# Patient Record
Sex: FEMALE | NOT HISPANIC OR LATINO | URBAN - METROPOLITAN AREA
[De-identification: names, ages, dates, MRNs, and addresses within clinical notes are randomized per-mention and may not be internally consistent; named-entity substitution may affect disease eponyms.]

---

## 2017-02-17 ENCOUNTER — APPOINTMENT (OUTPATIENT)
Dept: WOMENS IMAGING | Facility: HOSPITAL | Age: 47
End: 2017-02-17

## 2017-02-17 PROCEDURE — 77067 SCR MAMMO BI INCL CAD: CPT | Performed by: RADIOLOGY

## 2017-02-17 PROCEDURE — G0202 SCR MAMMO BI INCL CAD: HCPCS | Performed by: RADIOLOGY

## 2017-02-17 PROCEDURE — 77063 BREAST TOMOSYNTHESIS BI: CPT | Performed by: RADIOLOGY

## 2018-02-19 ENCOUNTER — APPOINTMENT (OUTPATIENT)
Dept: WOMENS IMAGING | Facility: HOSPITAL | Age: 48
End: 2018-02-19

## 2018-02-19 PROCEDURE — MDREVIEWSP: Performed by: RADIOLOGY

## 2018-02-19 PROCEDURE — 77067 SCR MAMMO BI INCL CAD: CPT | Performed by: RADIOLOGY

## 2018-02-19 PROCEDURE — 77063 BREAST TOMOSYNTHESIS BI: CPT | Performed by: RADIOLOGY

## 2018-07-25 ENCOUNTER — OFFICE VISIT CONVERTED (OUTPATIENT)
Dept: FAMILY MEDICINE CLINIC | Age: 48
End: 2018-07-25
Attending: NURSE PRACTITIONER

## 2018-08-17 ENCOUNTER — OFFICE VISIT CONVERTED (OUTPATIENT)
Dept: FAMILY MEDICINE CLINIC | Age: 48
End: 2018-08-17
Attending: NURSE PRACTITIONER

## 2019-04-08 ENCOUNTER — APPOINTMENT (OUTPATIENT)
Dept: WOMENS IMAGING | Facility: HOSPITAL | Age: 49
End: 2019-04-08

## 2019-04-08 PROCEDURE — 77063 BREAST TOMOSYNTHESIS BI: CPT | Performed by: RADIOLOGY

## 2019-04-08 PROCEDURE — MDREVIEWSP: Performed by: RADIOLOGY

## 2019-04-08 PROCEDURE — 77067 SCR MAMMO BI INCL CAD: CPT | Performed by: RADIOLOGY

## 2020-06-04 ENCOUNTER — APPOINTMENT (OUTPATIENT)
Dept: WOMENS IMAGING | Facility: HOSPITAL | Age: 50
End: 2020-06-04

## 2020-06-04 PROCEDURE — 77063 BREAST TOMOSYNTHESIS BI: CPT | Performed by: RADIOLOGY

## 2020-06-04 PROCEDURE — 77067 SCR MAMMO BI INCL CAD: CPT | Performed by: RADIOLOGY

## 2020-11-06 ENCOUNTER — OFFICE VISIT CONVERTED (OUTPATIENT)
Dept: FAMILY MEDICINE CLINIC | Age: 50
End: 2020-11-06
Attending: NURSE PRACTITIONER

## 2020-11-06 ENCOUNTER — HOSPITAL ENCOUNTER (OUTPATIENT)
Dept: OTHER | Facility: HOSPITAL | Age: 50
Discharge: HOME | End: 2020-11-06
Attending: NURSE PRACTITIONER

## 2020-11-08 LAB
BACTERIA SPEC AEROBE CULT: NORMAL
SARS-COV-2 RNA SPEC QL NAA+PROBE: NOT DETECTED

## 2021-05-18 NOTE — PROGRESS NOTES
Vivian Crenshaw 1970     Office/Outpatient Visit    Visit Date:  04:18 pm    Provider: Caitlin Garrison N.P. (Assistant: Miriam Sherman MA)    Location: [facility]        Electronically signed by Caitlin Garrison N.P. on  2018 05:34:22 PM                             SUBJECTIVE:        CC:     Ms. Crenshaw is a 47 year old White female.  Patient complains of pain in the left ear.;         HPI:         Patient to be evaluated for otalgia.      Ms. Crenshaw complains of left ear pain.  There are no other symptoms; specifically, she has not had cough, dizziness and fever.  The symptoms began 2 days ago.  Pertinent history includes allergies.  She has not tried any medications for symptomatic relief.      ROS:     CONSTITUTIONAL:  Negative for chills, fatigue and fever.      E/N/T:  Positive for ear pain ( left ) and nasal congestion.   Negative for frequent rhinorrhea.      CARDIOVASCULAR:  Negative for chest pain and pedal edema.      RESPIRATORY:  Negative for recent cough and dyspnea.      GASTROINTESTINAL:  Negative for abdominal pain, constipation, diarrhea, heartburn, nausea and vomiting.      NEUROLOGICAL:  Negative for dizziness, fainting, headaches and paresthesias.      ENDOCRINE:  Negative for hair loss, polydipsia and polyphagia.      ALLERGIC/IMMUNOLOGIC:  Negative for seasonal allergies.      PSYCHIATRIC:  Negative for anxiety, depression and suicidal thoughts.          PMH/FMH/SH:     Last Reviewed on 2017 04:03 PM by Kathya Sherman    Surgical History:         Appendectomy    Tubal Ligation         Family History:     Father:  at age 69; Cause of death was CHF;  Hypertension     Mother: Aortic Aneurysm ( AAA );  Breast Cancer         Social History:     Occupation: Employed at [email or web address]     Marital Status:      Children: 2 children         Tobacco/Alcohol/Supplements:     Last Reviewed on 2018 04:21 PM by Miriam Sherman    Tobacco: She has  never smoked.          Substance Abuse History:     NEGATIVE             Current Problems:     Serous otitis media     Screening for cholesterol level     Varicose leg veins     Common migraine         Immunizations:     Fluzone (3 + years dose) 9/23/2010         Allergies:     Last Reviewed on 7/25/2018 04:18 PM by Miriam Sherman      No Known Drug Allergies.         Current Medications:     Last Reviewed on 7/25/2018 04:18 PM by Miriam Sherman    Naratriptan HCl 2.5mg Tablet Take 1 tablet(s) by mouth at onset of migraine.  May repeat once in 4 hours if needed.  Take no more than 5mg in a 24 hour period.     Topamax 50mg Tablet 1 BID     Naproxen 500mg Tablet         OBJECTIVE:        Vitals:         Current: 7/25/2018 4:23:03 PM    Ht:  5 ft, 5.75 in;  Wt: 177.3 lbs;  BMI: 28.8    T: 98.5 F (oral);  BP: 127/75 mm Hg (left arm, sitting);  P: 76 bpm (left arm (BP Cuff), sitting);  sCr: 0.8 mg/dL;  GFR: 94.15        Exams:     PHYSICAL EXAM:     GENERAL: vital signs recorded - well developed, well nourished;  no apparent distress;     E/N/T: EARS: external auditory canal normal;  both TMs are dull and yellow;  NOSE:  normal nasal mucosa, septum, turbinates, and sinuses; OROPHARYNX:  normal mucosa, dentition, gingiva, and posterior pharynx;     NECK: range of motion is normal;     RESPIRATORY: normal appearance and symmetric expansion of chest wall; normal respiratory rate and pattern with no distress; normal breath sounds with no rales, rhonchi, wheezes or rubs;     CARDIOVASCULAR: normal rate; rhythm is regular;  no edema;     LYMPHATIC: no enlargement of cervical or facial nodes; no supraclavicular nodes;     MUSCULOSKELETAL: normal gait; normal range of motion of all major muscle groups; no limb or joint pain with range of motion;     NEUROLOGIC: mental status: alert and oriented x 3;     PSYCHIATRIC: appropriate affect and demeanor; normal speech pattern; normal thought and perception;         ASSESSMENT            381.4   H65.02   H65.01  Serous otitis media              DDx:         ORDERS:         Meds Prescribed:       Augmentin (Amoxicillin/Clavulanate) 875mg/125mg Tablet 1 tab bid X 10 days  #20 (Twenty) tablet(s) Refills: 0                 PLAN:          Serous otitis media           Prescriptions:       Augmentin (Amoxicillin/Clavulanate) 875mg/125mg Tablet 1 tab bid X 10 days  #20 (Twenty) tablet(s) Refills: 0           Patient Education Handouts:       Acute Ear Infection (Otitis Media)              CHARGE CAPTURE           **Please note: ICD descriptions below are intended for billing purposes only and may not represent clinical diagnoses**        Primary Diagnosis:         381.4 Serous otitis media            H65.02    Acute serous otitis media, left ear           H65.01    Acute serous otitis media, right ear              Orders:          06247   Office/outpatient visit; established patient, level 3  (In-House)

## 2021-05-18 NOTE — PROGRESS NOTES
Vivian Crenshaw  1970     Office/Outpatient Visit    Visit Date:  08:54 am    Provider: Caitlin Garrison N.P. (Assistant: Kayleigh Huntley LPN)    Location: [facility]        Electronically signed by Caitlin Garrison N.P. on  2020 12:53:00 PM                             Subjective:        CC: Ms. Crenshaw is a 50 year old White female.  This visit is covered under Worker's Compensation.  She presents with cold symptoms.          HPI:           Acute upper respiratory infection, unspecified noted.  These have been present for the past worse over the past 3-4 days  but has been going on for a couple of weeks.  The symptoms include body aches, ear pain and congestion,  temporal headache, nasal congestion and sinus pain/pressure.  She denies Chills, cough, fever or nasal discharge.  She denies exposure to ill contacts.  She has not tried any medications for symptomatic relief.  Medical history is significant for allergies and recurrent bronchitis.      ROS:     CONSTITUTIONAL:  Negative for chills and fever.      E/N/T:  Positive for ear pain ( left ) and nasal congestion ( left side ).      CARDIOVASCULAR:  Negative for chest pain and pedal edema.      RESPIRATORY:  Negative for recent cough and dyspnea.      GASTROINTESTINAL:  Positive for nausea.   Negative for diarrhea or vomiting.      NEUROLOGICAL:  Positive for headaches.      ALLERGIC/IMMUNOLOGIC:  Positive for seasonal allergies.          Past Medical History / Family History / Social History:         Last Reviewed on 2018 05:32 PM by Ember Wilson    Surgical History:         Appendectomy    Tubal Ligation         Family History:     Father:  at age 69; Cause of death was CHF;  Hypertension     Mother: Aortic Aneurysm ( AAA );  Breast Cancer         Social History:     Occupation: Employed at [employer]     Marital Status:      Children: 2 children         Tobacco/Alcohol/Supplements:      Last Reviewed on 8/17/2018 05:32 PM by Ember Wilson    Tobacco: She has never smoked.          Substance Abuse History:     Last Reviewed on 8/17/2018 05:32 PM by Ember Wilson    NEGATIVE         Mental Health History:     Last Reviewed on 8/17/2018 05:32 PM by Ember Wilson        Communicable Diseases (eg STDs):     Last Reviewed on 8/17/2018 05:32 PM by Ember Wilson        Current Problems:     Last Reviewed on 8/17/2018 05:32 PM by Ember Wilson    Common migraine    Varicose leg veins    Screening for cholesterol level    Acute serous otitis media, right ear    Acute serous otitis media, left ear    Serous otitis media        Immunizations:     Fluzone (3 + years dose) 9/23/2010        Allergies:     Last Reviewed on 8/17/2018 05:32 PM by Ember Wilson    No Known Allergies.        Current Medications:     Last Reviewed on 8/17/2018 05:32 PM by Ember Wilson    Naproxen 500mg Tablet    Naratriptan HCl 2.5mg Tablet [Take 1 tablet(s) by mouth at onset of migraine.  May repeat once in 4 hours if needed.  Take no more than 5mg in a 24 hour period.]    Topamax 50mg Tablet [1 BID]        Objective:        Vitals:         Current: 11/6/2020 8:58:17 AM    Ht:  5 ft, 5.75 in;  Wt: 179.6 lbs;  BMI: 29.2T: 97.4 F (oral);  BP: 124/69 mm Hg (left arm, sitting);  P: 79 bpm (left arm (BP Cuff), sitting);  sCr: 0.8 mg/dL;  GFR: 91.70O2 Sat: 100 % (room air)        Exams:     PHYSICAL EXAM:     GENERAL: vital signs recorded - well developed, well nourished;  no apparent distress, appears moderately ill;     E/N/T: EARS: the left TM is bulging, has fluid behind it, and pink;  NOSE: left maxillary sinus tenderness present;     NECK: range of motion is normal;     RESPIRATORY: normal appearance and symmetric expansion of chest wall; normal respiratory rate and pattern with no distress; normal breath sounds with no rales, rhonchi, wheezes or rubs;     CARDIOVASCULAR: normal rate; rhythm is  regular;  no edema;     LYMPHATIC: left submandibular node ( tender );     MUSCULOSKELETAL: normal gait; normal range of motion of all major muscle groups; no limb or joint pain with range of motion;     NEUROLOGIC: mental status: alert and oriented x 3;     PSYCHIATRIC: appropriate affect and demeanor; normal speech pattern; normal thought and perception;         Lab/Test Results:         Rapid Strep Screen: Negative (11/06/2020),     Performed by:: tlb (11/06/2020),     Influenza A and B: Negative (11/06/2020),             Assessment:         J01.90   Acute sinusitis, unspecified       R51.9   Headache, unspecified           ORDERS:         Meds Prescribed:       [New Rx] cetirizine 10 mg oral tablet [take 1 tablet (10 mg) by oral route once daily], #30 (thirty) tablets, Refills: 0 (zero)       [New Rx] fluticasone propionate 50 mcg/actuation Intranasal Spray, Suspension [inhale 1 spray (50 mcg) in each nostril by intranasal route 1 times per day], #16 (sixteen) grams, Refills: 0 (zero)       [New Rx] Augmentin 875-125 mg oral tablet [take 1 tablet by oral route every 12 hours x 7 days], #14 (fourteen) tablets, Refills: 0 (zero)         Radiology/Test Orders:       [id number]  COVID 19 Testing  (Send-Out)              Lab Orders:       [id number]  Infectious agent antigen detection by immunoassay; Influenza  (In-House)            85296  Group A Streptococcus detection by immunoassay with direct optical observation  (In-House)            66918  Infectious agent antigen detection by immunoassay; Influenza  (In-House)            42515  [facility] Throat culture, strep  (Send-Out)                      Plan:         Acute sinusitis, unspecified          Prescriptions:       [New Rx] cetirizine 10 mg oral tablet [take 1 tablet (10 mg) by oral route once daily], #30 (thirty) tablets, Refills: 0 (zero)       [New Rx] fluticasone propionate 50 mcg/actuation Intranasal Spray, Suspension [inhale 1 spray (50 mcg) in each nostril by  intranasal route 1 times per day], #16 (sixteen) grams, Refills: 0 (zero)       [New Rx] Augmentin 875-125 mg oral tablet [take 1 tablet by oral route every 12 hours x 7 days], #14 (fourteen) tablets, Refills: 0 (zero)           Orders:       [id number]  Infectious agent antigen detection by immunoassay; Influenza  (In-House)            93833  Group A Streptococcus detection by immunoassay with direct optical observation  (In-House)            28131  Infectious agent antigen detection by immunoassay; Influenza  (In-House)            52834  [facility] Throat culture, strep  (Send-Out)              Headache, unspecified    LABORATORY:  Labs ordered to be performed today include COVID 19 Testing.            Orders:       [id number]  COVID 19 Testing  (Send-Out)                  Charge Capture:         Primary Diagnosis:     J01.90  Acute sinusitis, unspecified           Orders:      [id number]  Office/outpatient visit; established patient, level 3  (In-House)            20796-73  Infectious agent antigen detection by immunoassay; Influenza  (In-House)            60942  Group A Streptococcus detection by immunoassay with direct optical observation  (In-House)            69630  Infectious agent antigen detection by immunoassay; Influenza  (In-House)              R51.9  Headache, unspecified         ADDENDUMS:      ____________________________________    Addendum: 11/10/2020 04:50 PM - Caitlin Garrison         This is covered under workers compensation

## 2021-05-18 NOTE — PROGRESS NOTES
Vivian Crenshaw 1970     Office/Outpatient Visit    Visit Date: Fri, Aug 17, 2018 04:33 pm    Provider: Ember Wilson N.P. (Assistant: Jose David Delvalle LPN)    Location: [facility]        Electronically signed by Ember Wilson N.P. on  2018 05:33:23 PM                             SUBJECTIVE:        CC:     Ms. Crenshaw is a 47 year old White female.  congestion, sore throat at night;         HPI:         Patient complains of uRI.  These have been present for the past one week.  The symptoms include chest congestion, Chills, cough and nasal congestion.  She denies body aches, ear complaints, fever, headache, sinus pain/pressure or sputum production.  She reports recent exposure to illness from clients.  She has already tried to relieve the symptoms with decongestants and ibuprofen.      ROS:     CONSTITUTIONAL:  Positive for chills and fatigue.      E/N/T:  Positive for nasal congestion, sinus pressure and sore throat.      CARDIOVASCULAR:  Negative for chest pain, orthopnea, paroxysmal nocturnal dyspnea and pedal edema.      RESPIRATORY:  Positive for recent cough ( typically dry ).   Negative for dyspnea.      GASTROINTESTINAL:  Negative for abdominal pain, constipation, diarrhea, nausea and vomiting.      PSYCHIATRIC:  Negative for anxiety, depression, and sleep disturbances.          PMH/FMH/SH:     Last Reviewed on 2018 05:32 PM by Ember Wilson    Surgical History:         Appendectomy    Tubal Ligation         Family History:     Father:  at age 69; Cause of death was CHF;  Hypertension     Mother: Aortic Aneurysm ( AAA );  Breast Cancer         Social History:     Occupation: Employed at [employer]     Marital Status:      Children: 2 children         Tobacco/Alcohol/Supplements:     Last Reviewed on 2018 05:32 PM by Ember Wilson    Tobacco: She has never smoked.              Current Problems:     Last Reviewed on 2018 05:32 PM by  Ember Wilson    Serous otitis media     Screening for cholesterol level     Varicose leg veins     Common migraine     Acute sinusitis, unspecified     URI         Immunizations:     Fluzone (3 + years dose) 9/23/2010         Allergies:     Last Reviewed on 8/17/2018 05:32 PM by Ember Wilson      No Known Drug Allergies.         Current Medications:     Last Reviewed on 8/17/2018 05:32 PM by Ember Wilson    Naratriptan HCl 2.5mg Tablet Take 1 tablet(s) by mouth at onset of migraine.  May repeat once in 4 hours if needed.  Take no more than 5mg in a 24 hour period.     Topamax 50mg Tablet 1 BID     Naproxen 500mg Tablet         OBJECTIVE:        Vitals:         Current: 8/17/2018 4:34:03 PM    Ht:  5 ft, 5.75 in;  Wt: 172.2 lbs;  BMI: 28.0    T: 98.3 F (oral);  BP: 126/76 mm Hg (left arm, sitting);  P: 71 bpm (finger clip, sitting);  sCr: 0.8 mg/dL;  GFR: 92.99        Exams:     PHYSICAL EXAM:     GENERAL: vital signs recorded - well developed, well nourished;  no apparent distress;     E/N/T: NOSE: left maxillary sinus tenderness present;     RESPIRATORY: normal respiratory rate and pattern with no distress; few scattered wheezes with coarse colleen bases;     CARDIOVASCULAR: normal rate; rhythm is regular;  no systolic murmur; no edema;     GASTROINTESTINAL: nontender; normal bowel sounds; no organomegaly;     PSYCHIATRIC:  appropriate affect and demeanor; normal speech pattern; grossly normal memory;         ASSESSMENT:           465.8   J06.9  URI              DDx:     461.9   J01.90  Acute sinusitis, unspecified              DDx:         ORDERS:         Meds Prescribed:       Fluticasone Propionate 50mcg/1actuation Nasal Spray 1 spray each nostil daily  #1 (One) gm Refills: 2       Amoxicillin 875mg Tablet One PO BID X 10 days.  #20 (Twenty) tablet(s) Refills: 0       Medrol (Methylprednisolone) 4mg Dosepak Take as directed with food  #1 (One) dose pack Refills: 0       Promethazine with  Dextromethrophan 6.25mg/15mg per 5ml Syrup 1 to 2 tsp po in evening and at hs for cough  #120 (One [place] and Twenty) oz Refills: 0                 PLAN:          URI         FOLLOW-UP: Advised to call if there is no improvement Follow-up by phone if no improvement in 1 week..   Schedule follow-up appointments on a p.r.n. basis..            Prescriptions:       Medrol (Methylprednisolone) 4mg Dosepak Take as directed with food  #1 (One) dose pack Refills: 0       Promethazine with Dextromethrophan 6.25mg/15mg per 5ml Syrup 1 to 2 tsp po in evening and at hs for cough  #120 (One [place] and Twenty) oz Refills: 0          Acute sinusitis, unspecified           Prescriptions:       Fluticasone Propionate 50mcg/1actuation Nasal Spray 1 spray each nostil daily  #1 (One) gm Refills: 2       Amoxicillin 875mg Tablet One PO BID X 10 days.  #20 (Twenty) tablet(s) Refills: 0             Patient Recommendations:        For  URI:     Follow-up by phone if no improvement in 1 week. Schedule follow-up appointments as needed.                APPOINTMENT INFORMATION:        Monday Tuesday Wednesday Thursday Friday Saturday Sunday            Time:___________________AM  PM   Date:_____________________             CHARGE CAPTURE:           Primary Diagnosis:     465.8 URI            J06.9    Acute upper respiratory infection, unspecified              Orders:          [id number]   Office/outpatient visit; established patient, level 3  (In-House)           461.9 Acute sinusitis, unspecified            J01.90    Acute sinusitis, unspecified

## 2021-07-01 VITALS
WEIGHT: 177.3 LBS | SYSTOLIC BLOOD PRESSURE: 127 MMHG | DIASTOLIC BLOOD PRESSURE: 75 MMHG | HEIGHT: 66 IN | HEART RATE: 76 BPM | TEMPERATURE: 98.5 F | BODY MASS INDEX: 28.49 KG/M2

## 2021-07-01 VITALS
BODY MASS INDEX: 27.68 KG/M2 | DIASTOLIC BLOOD PRESSURE: 76 MMHG | SYSTOLIC BLOOD PRESSURE: 126 MMHG | TEMPERATURE: 98.3 F | WEIGHT: 172.2 LBS | HEART RATE: 71 BPM | HEIGHT: 66 IN

## 2021-07-02 VITALS
HEIGHT: 66 IN | TEMPERATURE: 97.4 F | BODY MASS INDEX: 28.87 KG/M2 | DIASTOLIC BLOOD PRESSURE: 69 MMHG | OXYGEN SATURATION: 100 % | HEART RATE: 79 BPM | WEIGHT: 179.6 LBS | SYSTOLIC BLOOD PRESSURE: 124 MMHG